# Patient Record
Sex: FEMALE | Race: WHITE | NOT HISPANIC OR LATINO | Employment: FULL TIME | URBAN - METROPOLITAN AREA
[De-identification: names, ages, dates, MRNs, and addresses within clinical notes are randomized per-mention and may not be internally consistent; named-entity substitution may affect disease eponyms.]

---

## 2017-12-10 ENCOUNTER — HOSPITAL ENCOUNTER (EMERGENCY)
Facility: MEDICAL CENTER | Age: 32
End: 2017-12-10
Attending: EMERGENCY MEDICINE
Payer: COMMERCIAL

## 2017-12-10 VITALS
SYSTOLIC BLOOD PRESSURE: 109 MMHG | OXYGEN SATURATION: 98 % | RESPIRATION RATE: 20 BRPM | WEIGHT: 140 LBS | DIASTOLIC BLOOD PRESSURE: 64 MMHG | TEMPERATURE: 98.1 F | HEIGHT: 70 IN | BODY MASS INDEX: 20.04 KG/M2 | HEART RATE: 114 BPM

## 2017-12-10 DIAGNOSIS — R11.2 NON-INTRACTABLE VOMITING WITH NAUSEA, UNSPECIFIED VOMITING TYPE: ICD-10-CM

## 2017-12-10 DIAGNOSIS — F11.93 HEROIN WITHDRAWAL (HCC): ICD-10-CM

## 2017-12-10 PROCEDURE — 99284 EMERGENCY DEPT VISIT MOD MDM: CPT

## 2017-12-10 RX ORDER — ONDANSETRON 4 MG/1
4 TABLET, ORALLY DISINTEGRATING ORAL EVERY 8 HOURS PRN
Qty: 10 TAB | Refills: 0 | Status: SHIPPED | OUTPATIENT
Start: 2017-12-10

## 2017-12-10 RX ORDER — ONDANSETRON 4 MG/1
4 TABLET, ORALLY DISINTEGRATING ORAL ONCE
Status: DISCONTINUED | OUTPATIENT
Start: 2017-12-10 | End: 2017-12-10 | Stop reason: HOSPADM

## 2017-12-10 ASSESSMENT — LIFESTYLE VARIABLES
TOTAL SCORE: 0
CONSUMPTION TOTAL: INCOMPLETE
HAVE YOU EVER FELT YOU SHOULD CUT DOWN ON YOUR DRINKING: NO
TOTAL SCORE: 0
EVER HAD A DRINK FIRST THING IN THE MORNING TO STEADY YOUR NERVES TO GET RID OF A HANGOVER: NO
EVER FELT BAD OR GUILTY ABOUT YOUR DRINKING: NO
HAVE PEOPLE ANNOYED YOU BY CRITICIZING YOUR DRINKING: NO
TOTAL SCORE: 0
DO YOU DRINK ALCOHOL: YES

## 2017-12-11 NOTE — ED PROVIDER NOTES
"ED Provider Note    Scribed for Terrence Freeman M.D. by Kenji Monson. 12/10/2017, 9:20 PM.    Means of arrival: Walk-in  History obtained from: Patient  History limited by: None    CHIEF COMPLAINT  Chief Complaint   Patient presents with   • Detox     pt last use was 36 hours ago of herion and xanax   • Medication Refill   • Generalized Body Aches     all over body        HPI  Adriana Waller is a 32 y.o. female who presents to the Emergency Department complaining of heroin and Xanax withdrawal symptoms. She last used both Heroin and Xanax 36 hours ago. Patient began abusing both heroin and Xanax two years ago. She endorses that she would like to stop using heroin. The patient reports associated generalized body aches, rhinorrhea, abdominal cramping, clear eye drainage, salter skin sensation she describes as \"things crawling on her skin,\" vomiting, diarrhea, and insomnia. She had 5-6 episodes of emesis today. She denies a fever. The patient has a prescription for Xanax that she cannot refill because it was prescribed in [place]. She reports experiencing seizures when she stops using Xanax.      REVIEW OF SYSTEMS  Pertinent positives include generalized body aches, rhinorrhea, abdominal cramping, clear eye drainage, salter skin sensation she describes as \"things crawling on her skin,\" vomiting, diarrhea, and insomnia. Pertinent negatives include fever.  E    PAST MEDICAL HISTORY   None noted.    SURGICAL HISTORY  patient denies any surgical history    SOCIAL HISTORY  Social History   Substance Use Topics   • Smoking status: None noted.        • Alcohol use Not noted.      History   Drug use: Heroin and Xanax abuse       FAMILY HISTORY  None.    CURRENT MEDICATIONS  Xanax.    ALLERGIES  Allergies   Allergen Reactions   • Doxycycline Hives       PHYSICAL EXAM  VITAL SIGNS: /64   Pulse (!) 114   Temp 36.7 °C (98.1 °F)   Resp 18   Ht 1.778 m (5' 10\")   Wt 63.5 kg (140 lb)   SpO2 98%   BMI " 20.09 kg/m²     Constitutional: Well developed, Well nourished, Looks uncomfortable.   HENT: Normocephalic, Atraumatic, Oropharynx moist.   Eyes: Pupils are dilated at 4 mm bilaterally. Conjunctiva normal, No discharge.   Cardiovascular: Normal heart rate, Normal rhythm, No murmurs, equal pulses.   Pulmonary: Normal breath sounds, No respiratory distress, No wheezing, No rales, No rhonchi.  Chest: No chest wall tenderness or deformity.   Abdomen:Soft, No tenderness, No masses, no rebound, no guarding.   Back: No CVA tenderness.   Musculoskeletal: No major deformities noted, No tenderness.   Skin: Warm, Dry, No erythema, No rash.   Neurologic: Alert & oriented x 3, Normal motor function,  No focal deficits noted.   Psychiatric: Affect normal, Judgment normal, Mood normal.     COURSE & MEDICAL DECISION MAKING  Pertinent Labs & Imaging studies reviewed. (See chart for details)    9:20 PM - Patient seen and examined at bedside. She repeatedly requested a prescription for Xanax and I explained that I cannot prescribe long-term Xanax prescriptions. I discussed possible methods of detoxification with the patient. Patient will be treated with ondansetron dispertab 4 mg.    9:31 PM Recheck: Patient re-evaluated at [facility]. Patient reports that she would like to be discharged with prescription for Zofran and directions to the nearest detoxification facility. I discussed plans for discharge with a prescription for Zofran. She was given a referral to [facility] and [place] but she eloped prior to receiving any of this discharge information.    Medical Decision Making: This point time I think the patient most likely is having narcotic withdrawal. She has dilated pupils vomiting and diarrhea which is consistent with narcotic withdrawal. I was going to treat her symptomatically for her nausea. And refer her to a outpatient treatment facility but patient eloped prior to getting her discharge paperwork. Patient's blood pressure was  not elevated not think this is benzo withdrawal.    The patient will return for new or worsening symptoms and is stable at the time of discharge.    The patient is referred to a primary physician for blood pressure management, diabetic screening, and for all other preventative health concerns.    DISPOSITION:  Patient will be discharged home in stable condition.    FOLLOW UP:  [address], [facility] [address]  [facility] [address]  [phone]  Go to  They can help you with your addiction      [facility] ([facility])  [address] [facility] [address]  [phone]    May be able to help you with your addiction      OUTPATIENT MEDICATIONS:  New Prescriptions    ONDANSETRON (ZOFRAN ODT) 4 MG TABLET DISPERSIBLE    Take 1 Tab by mouth every 8 hours as needed.          FINAL IMPRESSION  1. Heroin withdrawal (CMS-HCC)    2. Non-intractable vomiting with nausea, unspecified vomiting type          I, Kenji Monson (Scribe), am scribing for, and in the presence of, Terrence Freeman M.D.    Electronically signed by: Kenji Monson (Scribe), 12/10/2017    ITerrence M.D. personally performed the services described in this documentation, as scribed by Kenji Monson in my presence, and it is both accurate and complete.    The note accurately reflects work and decisions made by me.  Terrence Freeman  12/11/2017  3:53 AM

## 2017-12-11 NOTE — ED NOTES
Pt got dressed, removed all monitoring equipment. Pt walked out of room, refused to stop and talk to staff or sign any paperwork. Pt did not take prescription.

## 2017-12-11 NOTE — ED NOTES
Pt brought back to rm BL 20 from triage by WC. Pt states last use of heroin, xanax, and meth 36 hours ago. Pt reports prescription for suboxone was left in [place]. Pt able to transfer self to bed, pt in gown, on monitor, call light in reach. Chart up for ERP.

## 2017-12-11 NOTE — ED NOTES
"Triage notes    Pt ambulated to room but seems unsteady on feet, also drowsy in triage room states last use was 36 hours ago of heroin and xanax. Pt states she is only stopping due to no access to drugs as she is from [place].  Pt needs refills of medication, cyboxin (error spell)  and xanax      .  Chief Complaint   Patient presents with   • Detox     pt last use was 36 hours ago of herion and xanax   • Medication Refill     ./64   Pulse (!) 114   Temp 36.7 °C (98.1 °F)   Resp 18   Ht 1.778 m (5' 10\")   Wt 63.5 kg (140 lb)   SpO2 98%   BMI 20.09 kg/m²       "

## 2017-12-11 NOTE — DISCHARGE INSTRUCTIONS
"Return if you have a rapid heart rate and high blood pressure, severe vomiting, or seizure.     Narcotic Withdrawal  When drug use interferes with normal living activities and relationships, it is abuse. Abuse includes problems with family and friends. Psychological dependence has developed when your mind tells you that the drug is needed. This is usually followed by a physical dependence in which you need more of the drug to get the same feeling or \"high.\" This is known as addiction or chemical dependency. Risk is greater when chemical dependency exists in the family.  SYMPTOMS   When tolerance to narcotics has developed, stopping of the narcotic suddenly can cause uncomfortable physical symptoms. Most of the time these are mild and consist of shakes or jitters (tremors) in the hands,a rapid heart rate, rapid breathing, and temperature. Sometimes these symptoms are associated with anxiety, panic attacks, and bad dreams. Other symptoms include:  · Irritability.  · Anxiety.  · Runny nose.  · \"Goose flesh.\"  · Diarrhea.  · Feeling sick to the stomach (nauseous).  · Muscle spasms.  · Sleeplessness.  · Chills.  · Sweats.  · Drug cravings.  · Confusion.  The severity of the withdrawal is based on the individual and varies from person to person. Many people choose to continue using narcotics to get rid of the discomfort of withdrawal. They also use to try to feel normal.  TREATMENT   Quitting an addiction means stopping use of all chemicals. This is hard but may save your life. With continual drug use, possible outcomes are often loss of self respect and esteem, violence, death, and eventually MCC if the use of narcotics has led to the death of another.  Addiction cannot be cured, but it can be stopped. This often requires outside help and the care of professionals. Most hospitals and clinics can refer you to a specialized care center.  It is not necessary for you to go through the uncomfortable symptoms of withdrawal. " Your caregiver can provide you with medications that will help you through this difficult period. Try to avoid situations, friends, or alcohol, which may have made it possible for you to continue using narcotics in the past. Learn how to say no!  HOME CARE INSTRUCTIONS   · Drink fluids, get plenty of rest, and take hot baths.  · Medicines may be prescribed to help control withdrawal symptoms.  · Over-the-counter medicines may be helpful to control diarrhea or an upset stomach.  · If your problems resulted from taking prescription pain medicines, make sure you have a follow-up visit with your caregiver within the next few days. Be open about this problem.  · If you are dependent or addicted to street drugs, contact a local drug and alcohol treatment center or Narcotics Anonymous.  · Have someone with you to monitor your symptoms.  · Engage in healthy activities with friends who do not use drugs.  · Stay away from the drug scene.  It takes a long period of time to overcome addictions to all drugs. There may be times when you feel as though you want to use. Following loss of a physical addiction and going through withdrawal, you have conquered the most difficult part of getting rid of an addiction. Gradually, you will have a lessening of the craving that is telling you that you need narcotics to feel normal. Call your caregiver or a member of your support group if more support is needed. Learn who to talk to in your family and among your friends so that during these periods you can receive outside help.  SEEK IMMEDIATE MEDICAL CARE IF:   · You have vomiting that cannot be controlled, especially if you cannot keep liquids down.  · You are seeing things or hearing voices that are not really there (hallucinating).  · You have a seizure.  Document Released: 03/09/2004 Document Revised: 03/11/2013 Document Reviewed: 12/13/2009  ExitCare® Patient Information ©2014 [employer].    Finding Treatment for Addiction  WHAT IS  ADDICTION?  Addiction is a complex disease of the brain. It causes an uncontrollable (compulsive) need for a substance. You can be addicted to alcohol, illegal drugs, or prescription medicines such as painkillers. Addiction can also be a behavior, like gambling or shopping. The need for the drug or activity can become so strong that you think about it all the time. You can also become physically dependent on a substance.  Addiction can change the way your brain works. Because of these changes, getting more of whatever you are addicted to becomes the most important thing to you and feels better than other activities or relationships. Addiction can lead to changes in health, behavior, emotions, relationships, and choices that affect you and everyone around you.  HOW DO I KNOW IF I NEED TREATMENT FOR ADDICTION?  Addiction is a progressive disease. Without treatment, addiction can get worse. Living with addiction puts you at higher risk for injury, poor health, lost employment, loss of money, and even death.  You might need treatment for addiction if:  · You have tried to stop or cut down, but you cannot.  · Your addiction is causing physical health problems.  · You find it annoying that your friends and family are concerned about your alcohol or substance use.  · You feel guilty about substance abuse or a compulsive behavior.  · You have lied or tried to hide your addiction.  · You need a particular substance or activity to start your day or to calm down.  · You are getting in trouble at school, work, home, or with the police.  · You have done something illegal to support your addiction.  · You are running out of money because of your addiction.  · You have no time for anything other than your addiction.  WHAT TYPES OF TREATMENT ARE AVAILABLE?  The treatment program that is right for you will depend on many factors, including the type of addiction you have. Treatment programs can be outpatient or inpatient. In an  outpatient program, you live at home and go to work or school, but you also go to a clinic for treatment. With an inpatient program, you live and sleep at the program facility during treatment. After treatment, you might need a plan for support during recovery. Other treatment options include:   · Medicine.  ¨ Some addictions may be treated with prescription medicines.  ¨ You might also need medicine to treat anxiety or depression.  · Counseling and behavior therapy. Therapy can help individuals and families behave in healthier ways and relate more effectively.  · Support groups. Confidential group therapy, such as a 12-step program, can help individuals and families during treatment and recovery.  No single type of program is right for everyone. Many treatment programs involve a combination of education, counseling, and a 12-step, spiritually-based approach. Some treatment programs are government sponsored. They are geared for patients who do not have private insurance. Treatment programs can vary in many respects, such as:  · Cost and types of insurance that are accepted.  · Types of onsite medical services that are offered.  · Length of stay, setting, and size.  · Overall philosophy of treatment.  WHAT SHOULD I CONSIDER WHEN SELECTING A TREATMENT PROGRAM?  It is important to think about your individual requirements when selecting a treatment program. There are a number of things to consider, such as:  · If the program is certified by the appropriate government agency. Even private programs must be certified and employ certified professionals.  · If the program is covered by your insurance. If finances are a concern, the first call you should make is to your insurance company, if you have health insurance. Ask for a list of treatment programs that are in your network, and confirm any copayments and deductibles that you may have to pay.  ¨ If you do not have insurance, or if you choose to attend a program that does  not accept your insurance, discuss whether a payment plan can be set up.  · If treatment is available in languages other than English, if needed.  · If the program offers detoxification treatment, if needed.  · If 12-step meetings are held at the center or if transport is available for patients to attend meetings at other locations.  · If the program is professional, organized, and clean.  · If the program meets all of your needs, including physical and cultural needs.  · If the facility offers specific treatment for your particular addiction.  · If support continues to be offered after you have left the program.  · If your treatment plan is continually looked at to make sure you are receiving the right treatment at the right time.  · If mental health counseling is part of your treatment.  · If medicine is included in treatment, if needed.  · If your family is included in your treatment plan and if support is offered to them throughout the treatment process.  · How the treatment works to prevent relapse.  WHERE ELSE CAN I GET HELP?  · Your health care provider. Ask him or her to help you find addiction treatment. These discussions are confidential.  · The [employer] [place] on [employer]ism and Drug Dependence (NCADD). This group has information about treatment centers and programs for people who have an addiction and for family members.  ¨ The telephone number is 7-503-AYR-CALL (1-885.310.2250).  ¨ The website is https://ncadd.org/about-ncadd/our-affiliates  · The Substance Abuse and Mental Health Services Administration (SAMHSA). This group will help you find publicly funded treatment centers, help hotlines, and counseling services near you.  ¨ The telephone number is 6-130-162-HELP (1-276.873.4312).  ¨ The website is www.findtreatment.samhsa.gov  In countries outside of the [place] and [place], look in local directories for contact information for services in your area.     This information is not intended to replace  advice given to you by your health care provider. Make sure you discuss any questions you have with your health care provider.     Document Released: 11/16/2006 Document Revised: 01/08/2016 Document Reviewed: 10/06/2015  [employer]vier Interactive Patient Education ©2016 [employer]